# Patient Record
Sex: FEMALE | Race: AMERICAN INDIAN OR ALASKA NATIVE
[De-identification: names, ages, dates, MRNs, and addresses within clinical notes are randomized per-mention and may not be internally consistent; named-entity substitution may affect disease eponyms.]

---
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
## 2018-02-25 ENCOUNTER — HOSPITAL ENCOUNTER (EMERGENCY)
Dept: HOSPITAL 5 - ED | Age: 18
Discharge: HOME | End: 2018-02-25
Payer: COMMERCIAL

## 2018-02-25 VITALS — DIASTOLIC BLOOD PRESSURE: 80 MMHG | SYSTOLIC BLOOD PRESSURE: 133 MMHG
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
## 2018-02-25 DIAGNOSIS — S93.491A: Primary | ICD-10-CM

## 2018-02-25 NOTE — EMERGENCY DEPARTMENT REPORT
ED Lower Extremity HPI





- General


Chief Complaint: Extremity Injury, Lower


Stated Complaint: MEDICAL CLEARENCE/RIGHT FOOT PAIN


Time Seen by Provider: 02/25/18 09:52


Source: patient


Mode of arrival: Ambulatory


Limitations: No Limitations





- History of Present Illness


Initial Comments: 





This is a 18-year-old female nontoxic, well nourished in appearance, no acute 

signs of distress presents to the ED with c/o of right ankle pain and swelling 

x1 day.  Patient stated this morning for him she got on her right ankle times 

one.  Patient denies any numbness, tingling, fever, chills, nausea, vomiting, 

headache or stiff neck, chest pain shortness of breath.  Patient denies any 

other trauma.  Patient denies any allergies or significant past medical history.


MD Complaint: ankle injury


-: This morning


Injury: Ankle: Right


Type of Injury: blunt


Severity: mild


Severity scale (0 -10): 8


Improves With: immobilization


Worsens With: movement


Associated Symptoms: swelling, able to partially bear weight, ambulatory.  

denies: snap/pop sensation, numbness, tingling, unable to bear weight





- Related Data


 Home Medications











 Medication  Instructions  Recorded  Confirmed  Last Taken


 


Pnv,Calcium 72/Iron/Folic Acid 1 tab PO QDAY 04/28/16 08/15/16 07/22/15 09:00





[Pnv Prenatal Plus Multivit Tab]    1 tab








 Previous Rx's











 Medication  Instructions  Recorded  Last Taken  Type


 


Ibuprofen [Motrin] 600 mg PO Q8H PRN #30 tablet 02/25/18 Unknown Rx











 Allergies











Allergy/AdvReac Type Severity Reaction Status Date / Time


 


No Known Allergies Allergy   Verified 02/25/18 09:45














ED Review of Systems


ROS: 


Stated complaint: MEDICAL CLEARENCE/RIGHT FOOT PAIN


Other details as noted in HPI





Constitutional: denies: chills, fever


Eyes: denies: eye pain, eye discharge, vision change


ENT: denies: ear pain, throat pain


Respiratory: denies: cough, shortness of breath, wheezing


Cardiovascular: denies: chest pain, palpitations


Endocrine: no symptoms reported


Gastrointestinal: denies: abdominal pain, nausea, diarrhea


Genitourinary: denies: urgency, dysuria, discharge


Musculoskeletal: arthralgia.  denies: back pain, joint swelling


Skin: denies: rash, lesions


Neurological: denies: headache, weakness, paresthesias


Psychiatric: denies: anxiety, depression


Hematological/Lymphatic: denies: easy bleeding, easy bruising





ED Past Medical Hx





- Past Medical History


Hx Hypertension: No


Hx Heart Attack/AMI: No


Hx Congestive Heart Failure: No


Hx Diabetes: No


Hx Deep Vein Thrombosis: No


Hx Renal Disease: No


Hx Sickle Cell Disease: No


Hx Seizures: No


Hx Asthma: No


Hx COPD: No


Hx HIV: No


Additional medical history: Anemia





- Surgical History


Hx Appendectomy: Yes


Additional Surgical History: spinal fusion at 11 yrs old for scoliosis





- Social History


Smoking Status: Never Smoker


Substance Use Type: None





- Medications


Home Medications: 


 Home Medications











 Medication  Instructions  Recorded  Confirmed  Last Taken  Type


 


Pnv,Calcium 72/Iron/Folic Acid 1 tab PO QDAY 04/28/16 08/15/16 07/22/15 09:00 

History





[Pnv Prenatal Plus Multivit Tab]    1 tab 


 


Ibuprofen [Motrin] 600 mg PO Q8H PRN #30 tablet 02/25/18  Unknown Rx














ED Physical Exam





- General


Limitations: No Limitations


General appearance: alert, in no apparent distress





- Head


Head exam: Present: atraumatic, normocephalic





- Eye


Eye exam: Present: normal appearance, PERRL, EOMI


Pupils: Present: normal accommodation





- ENT


ENT exam: Present: normal exam, normal orophraynx, mucous membranes moist, TM's 

normal bilaterally, normal external ear exam





- Neck


Neck exam: Present: normal inspection, full ROM.  Absent: tenderness, 

meningismus, lymphadenopathy, thyromegaly





- Respiratory


Respiratory exam: Present: normal lung sounds bilaterally.  Absent: respiratory 

distress, wheezes, rales, rhonchi, stridor, chest wall tenderness, accessory 

muscle use, decreased breath sounds, prolonged expiratory





- Cardiovascular


Cardiovascular Exam: Present: regular rate, normal rhythm, normal heart sounds.

  Absent: irregular rhythm, systolic murmur, diastolic murmur, rubs, gallop





- GI/Abdominal


GI/Abdominal exam: Present: soft, normal bowel sounds.  Absent: distended, 

tenderness, guarding, rebound, rigid, diminished bowel sounds





- Extremities Exam


Extremities exam: Present: normal inspection, full ROM, tenderness, normal 

capillary refill.  Absent: pedal edema, joint swelling, calf tenderness





- Expanded Lower Extremity Exam


  ** Right


Hip exam: Present: normal inspection, full ROM


Upper Leg exam: Present: normal inspection, full ROM


Knee exam: Present: normal inspection, full ROM


Lower Leg exam: Present: normal inspection, full ROM


Ankle exam: Present: normal inspection, full ROM, tenderness, swelling.  Absent

: abrasion, laceration, ecchymosis, deformity, crepidus, dislocation, erythema, 

anterior draw sign


Foot/Toe exam: Present: normal inspection, full ROM.  Absent: tenderness, 

swelling, abrasion, ecchymosis, deformity, crepidus, dislocation, erythema, 

amputation, puncture wound, foreign body, calcaneal tenderness, tenderness at 

base of 5th metatarsal, nail avulsion, subungual hematoma


Neuro vascular tendon exam: Present: no vascular compromise.  Absent: pulse 

deficit, abnormal cap refill, motor deficit, sensory deficit, tendon deficit, 

extremity cold to touch, pallor, abnormal 2-point discrimination, decreased fine

/light touch, foot drop, peroneal nerve deficit, significant pain with passive 

ROM of distal joint


Gait: Positive: observed and limited by pain





  __________________________














  __________________________





 1 - pain with swelling








- Back Exam


Back exam: Present: normal inspection, full ROM.  Absent: tenderness, CVA 

tenderness (R), CVA tenderness (L), muscle spasm, paraspinal tenderness, 

vertebral tenderness, rash noted





- Neurological Exam


Neurological exam: Present: alert, oriented X3, CN II-XII intact, normal gait, 

reflexes normal





- Psychiatric


Psychiatric exam: Present: normal affect, normal mood





- Skin


Skin exam: Present: warm, dry, intact, normal color.  Absent: rash





ED Course


 Vital Signs











  02/25/18 02/25/18





  09:45 10:32


 


Temperature 98.2 F 


 


Pulse Rate 100 H 


 


Respiratory 18 16





Rate  


 


Blood Pressure 133/80 


 


O2 Sat by Pulse 100 





Oximetry  














- Reevaluation(s)


Reevaluation #1: 





02/25/18 09:58


Patient is speaking in full sentences with no signs of distress noted.





ED Lower Extremity MDM





- Medical Decision Making





This is a 18-year-old female that presents with right ankle sprain.  Patient is 

stable and was examined by me.  X-ray has been obtained and dictated by 

radiologist within normal limits.  Patient is notified of x-ray results with no 

past noted by the patient.  Patient received rice therapy in the ED.  Patient 

also received Motrin and ankle stirrup in the ED.  Patient was instructed and 

referred to Follow-up with a orthopedic doctor in 3-5 days or if symptoms 

worsen and continue return to emergency room as soon as possible.  Patient is 

in custody with the police and is going to halfway, as per officer. At time of 

discharge, the patient does not seem toxic or ill in appearance.  No acute 

signs of distress noted.  Patient agrees to discharge treatment plan of care.  

No further questions noted by the patient.


Critical care attestation.: 


If time is entered above; I have spent that time in minutes in the direct care 

of this critically ill patient, excluding procedure time.








ED Disposition


Clinical Impression: 


Right ankle sprain


Qualifiers:


 Encounter type: initial encounter Involved ligament of ankle: unspecified 

ligament Qualified Code(s): S93.401A - Sprain of unspecified ligament of right 

ankle, initial encounter





Disposition: DC-01 TO HOME OR SELFCARE


Is pt being admited?: No


Does the pt Need Aspirin: No


Condition: Stable


Instructions:  Ankle Sprain (ED), Ankle Stirrup Splint (ED), RICE Therapy (ED)


Additional Instructions: 


Follow-up with a orthopedic doctor in 3-5 days or if symptoms worsen and 

continue return to emergency room as soon as possible. 


Prescriptions: 


Ibuprofen [Motrin] 600 mg PO Q8H PRN #30 tablet


 PRN Reason: Pain


Referrals: 


PRIMARY CARE,MD [Primary Care Provider] - 3-5 Days


REFUGIO ALLEN MD [Staff Physician] - 3-5 Days


[facility] [Outside] - 3-5 Days


[facility] [Outside] - 3-5 Days

## 2018-02-25 NOTE — XRAY REPORT
Right ankle 3 views:



History: Pain and swelling.



Findings:



No bony or articular abnormality. No fracture dislocation or soft 

tissue calcification.



Impression:



Essentially negative right ankle.

## 2019-07-07 ENCOUNTER — HOSPITAL ENCOUNTER (EMERGENCY)
Dept: HOSPITAL 5 - ED | Age: 19
LOS: 1 days | Discharge: HOME | End: 2019-07-08
Payer: COMMERCIAL

## 2019-07-07 DIAGNOSIS — Z86.2: ICD-10-CM

## 2019-07-07 DIAGNOSIS — Z79.1: ICD-10-CM
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
## 2019-07-07 DIAGNOSIS — S30.0XXA: Primary | ICD-10-CM
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
## 2019-07-07 PROCEDURE — 72070 X-RAY EXAM THORAC SPINE 2VWS: CPT

## 2019-07-07 PROCEDURE — 81025 URINE PREGNANCY TEST: CPT

## 2019-07-07 PROCEDURE — 99283 EMERGENCY DEPT VISIT LOW MDM: CPT

## 2019-07-07 PROCEDURE — 72040 X-RAY EXAM NECK SPINE 2-3 VW: CPT

## 2019-07-07 NOTE — XRAY REPORT
CERVICAL SPINE 4 VIEWS

THORACIC SPINE 3 VIEWS



INDICATION:

pain r/t injury- h/o spinal fusion.



COMPARISON:

No relevant prior imaging study available.



FINDINGS:

Cervical spine: No acute fracture or subluxation is seen, there is no prevertebral soft tissue swelli
ng. No significant degenerative changes.



Thoracic spine: Jayson rods are noted throughout with multilevel transpedicular screw fixation. H
ardware projects in expected position. No acute fracture or subluxation is seen.



IMPRESSION:

1. No acute findings.







Signer Name: Rodney Sy MD 

Signed: 7/7/2019 11:10 PM

 Workstation Name: [id number]

## 2019-07-08 VITALS — SYSTOLIC BLOOD PRESSURE: 130 MMHG | DIASTOLIC BLOOD PRESSURE: 84 MMHG

## 2020-03-16 ENCOUNTER — HOSPITAL ENCOUNTER (OUTPATIENT)
Dept: HOSPITAL 5 - XRAY | Age: 20
Discharge: HOME | End: 2020-03-16
Attending: INTERNAL MEDICINE
Payer: COMMERCIAL

## 2020-03-16 DIAGNOSIS — M54.5: Primary | ICD-10-CM

## 2020-03-16 PROCEDURE — 72100 X-RAY EXAM L-S SPINE 2/3 VWS: CPT

## 2020-03-16 NOTE — XRAY REPORT
XR spine lumbosacral 2-3V



INDICATION / CLINICAL INFORMATION:

BACK PAIN.



COMPARISON:

Thoracic spine radiographs on 7/7/2019.

 

FINDINGS:

BONES/JOINT(S): No acute fracture or subluxation. No significant degenerative changes. Spinal fixatio
n rods in the lower thoracic and upper lumbar spine appear unchanged.



SOFT TISSUES: No significant abnormality.



ADDITIONAL FINDINGS: None.







Signer Name: Stevenson Swan MD 

Signed: 3/16/2020 10:37 AM

Workstation Name: [id number]